# Patient Record
Sex: MALE | Race: OTHER | HISPANIC OR LATINO | URBAN - METROPOLITAN AREA
[De-identification: names, ages, dates, MRNs, and addresses within clinical notes are randomized per-mention and may not be internally consistent; named-entity substitution may affect disease eponyms.]

---

## 2022-12-08 ENCOUNTER — EMERGENCY (EMERGENCY)
Facility: HOSPITAL | Age: 67
LOS: 1 days | Discharge: HOME | End: 2022-12-08
Attending: EMERGENCY MEDICINE
Payer: MEDICARE

## 2022-12-08 VITALS
WEIGHT: 149.91 LBS | DIASTOLIC BLOOD PRESSURE: 123 MMHG | HEIGHT: 66 IN | TEMPERATURE: 98 F | OXYGEN SATURATION: 98 % | SYSTOLIC BLOOD PRESSURE: 201 MMHG | HEART RATE: 70 BPM | RESPIRATION RATE: 16 BRPM

## 2022-12-08 LAB
ALBUMIN SERPL ELPH-MCNC: 4.3 G/DL — SIGNIFICANT CHANGE UP (ref 3.3–5)
ALP SERPL-CCNC: 61 U/L — SIGNIFICANT CHANGE UP (ref 40–120)
ALT FLD-CCNC: 18 U/L — SIGNIFICANT CHANGE UP (ref 10–45)
ANION GAP SERPL CALC-SCNC: 11 MMOL/L — SIGNIFICANT CHANGE UP (ref 5–17)
AST SERPL-CCNC: 16 U/L — SIGNIFICANT CHANGE UP (ref 10–40)
BASOPHILS # BLD AUTO: 0.05 K/UL — SIGNIFICANT CHANGE UP (ref 0–0.2)
BASOPHILS NFR BLD AUTO: 0.7 % — SIGNIFICANT CHANGE UP (ref 0–2)
BILIRUB SERPL-MCNC: 1.2 MG/DL — SIGNIFICANT CHANGE UP (ref 0.2–1.2)
BUN SERPL-MCNC: 13 MG/DL — SIGNIFICANT CHANGE UP (ref 7–23)
CALCIUM SERPL-MCNC: 9.9 MG/DL — SIGNIFICANT CHANGE UP (ref 8.4–10.5)
CHLORIDE SERPL-SCNC: 102 MMOL/L — SIGNIFICANT CHANGE UP (ref 96–108)
CO2 SERPL-SCNC: 25 MMOL/L — SIGNIFICANT CHANGE UP (ref 22–31)
CREAT SERPL-MCNC: 0.77 MG/DL — SIGNIFICANT CHANGE UP (ref 0.5–1.3)
EGFR: 99 ML/MIN/1.73M2 — SIGNIFICANT CHANGE UP
EOSINOPHIL # BLD AUTO: 0.44 K/UL — SIGNIFICANT CHANGE UP (ref 0–0.5)
EOSINOPHIL NFR BLD AUTO: 5.9 % — SIGNIFICANT CHANGE UP (ref 0–6)
FLUAV AG NPH QL: SIGNIFICANT CHANGE UP
FLUBV AG NPH QL: SIGNIFICANT CHANGE UP
GLUCOSE SERPL-MCNC: 91 MG/DL — SIGNIFICANT CHANGE UP (ref 70–99)
HCT VFR BLD CALC: 40.4 % — SIGNIFICANT CHANGE UP (ref 39–50)
HGB BLD-MCNC: 12.8 G/DL — LOW (ref 13–17)
IMM GRANULOCYTES NFR BLD AUTO: 0.3 % — SIGNIFICANT CHANGE UP (ref 0–0.9)
LYMPHOCYTES # BLD AUTO: 2.16 K/UL — SIGNIFICANT CHANGE UP (ref 1–3.3)
LYMPHOCYTES # BLD AUTO: 29 % — SIGNIFICANT CHANGE UP (ref 13–44)
MCHC RBC-ENTMCNC: 27.7 PG — SIGNIFICANT CHANGE UP (ref 27–34)
MCHC RBC-ENTMCNC: 31.7 GM/DL — LOW (ref 32–36)
MCV RBC AUTO: 87.4 FL — SIGNIFICANT CHANGE UP (ref 80–100)
MONOCYTES # BLD AUTO: 0.51 K/UL — SIGNIFICANT CHANGE UP (ref 0–0.9)
MONOCYTES NFR BLD AUTO: 6.8 % — SIGNIFICANT CHANGE UP (ref 2–14)
NEUTROPHILS # BLD AUTO: 4.28 K/UL — SIGNIFICANT CHANGE UP (ref 1.8–7.4)
NEUTROPHILS NFR BLD AUTO: 57.3 % — SIGNIFICANT CHANGE UP (ref 43–77)
NRBC # BLD: 0 /100 WBCS — SIGNIFICANT CHANGE UP (ref 0–0)
PLATELET # BLD AUTO: 168 K/UL — SIGNIFICANT CHANGE UP (ref 150–400)
POTASSIUM SERPL-MCNC: 3.8 MMOL/L — SIGNIFICANT CHANGE UP (ref 3.5–5.3)
POTASSIUM SERPL-SCNC: 3.8 MMOL/L — SIGNIFICANT CHANGE UP (ref 3.5–5.3)
PROT SERPL-MCNC: 7.3 G/DL — SIGNIFICANT CHANGE UP (ref 6–8.3)
RBC # BLD: 4.62 M/UL — SIGNIFICANT CHANGE UP (ref 4.2–5.8)
RBC # FLD: 12.4 % — SIGNIFICANT CHANGE UP (ref 10.3–14.5)
RSV RNA NPH QL NAA+NON-PROBE: SIGNIFICANT CHANGE UP
SARS-COV-2 RNA SPEC QL NAA+PROBE: SIGNIFICANT CHANGE UP
SODIUM SERPL-SCNC: 138 MMOL/L — SIGNIFICANT CHANGE UP (ref 135–145)
WBC # BLD: 7.46 K/UL — SIGNIFICANT CHANGE UP (ref 3.8–10.5)
WBC # FLD AUTO: 7.46 K/UL — SIGNIFICANT CHANGE UP (ref 3.8–10.5)

## 2022-12-08 PROCEDURE — 99053 MED SERV 10PM-8AM 24 HR FAC: CPT

## 2022-12-08 PROCEDURE — 99220: CPT | Mod: 25

## 2022-12-08 PROCEDURE — 76512 OPH US DX B-SCAN: CPT | Mod: 26,LT

## 2022-12-08 PROCEDURE — 70486 CT MAXILLOFACIAL W/O DYE: CPT | Mod: 26,MA

## 2022-12-08 PROCEDURE — 76377 3D RENDER W/INTRP POSTPROCES: CPT | Mod: 26

## 2022-12-08 PROCEDURE — 12011 RPR F/E/E/N/L/M 2.5 CM/<: CPT

## 2022-12-08 PROCEDURE — 70450 CT HEAD/BRAIN W/O DYE: CPT | Mod: 26,MA

## 2022-12-08 RX ORDER — ACETAZOLAMIDE 250 MG/1
500 TABLET ORAL ONCE
Refills: 0 | Status: DISCONTINUED | OUTPATIENT
Start: 2022-12-08 | End: 2022-12-08

## 2022-12-08 RX ORDER — TETANUS TOXOID, REDUCED DIPHTHERIA TOXOID AND ACELLULAR PERTUSSIS VACCINE, ADSORBED 5; 2.5; 8; 8; 2.5 [IU]/.5ML; [IU]/.5ML; UG/.5ML; UG/.5ML; UG/.5ML
0.5 SUSPENSION INTRAMUSCULAR ONCE
Refills: 0 | Status: COMPLETED | OUTPATIENT
Start: 2022-12-08 | End: 2022-12-08

## 2022-12-08 RX ORDER — LATANOPROST 0.05 MG/ML
1 SOLUTION/ DROPS OPHTHALMIC; TOPICAL AT BEDTIME
Refills: 0 | Status: DISCONTINUED | OUTPATIENT
Start: 2022-12-08 | End: 2022-12-11

## 2022-12-08 RX ORDER — ATORVASTATIN CALCIUM 80 MG/1
80 TABLET, FILM COATED ORAL AT BEDTIME
Refills: 0 | Status: DISCONTINUED | OUTPATIENT
Start: 2022-12-08 | End: 2022-12-11

## 2022-12-08 RX ORDER — AMPICILLIN SODIUM AND SULBACTAM SODIUM 250; 125 MG/ML; MG/ML
3 INJECTION, POWDER, FOR SUSPENSION INTRAMUSCULAR; INTRAVENOUS ONCE
Refills: 0 | Status: COMPLETED | OUTPATIENT
Start: 2022-12-08 | End: 2022-12-08

## 2022-12-08 RX ORDER — BRIMONIDINE TARTRATE 2 MG/MG
1 SOLUTION/ DROPS OPHTHALMIC THREE TIMES A DAY
Refills: 0 | Status: DISCONTINUED | OUTPATIENT
Start: 2022-12-08 | End: 2022-12-11

## 2022-12-08 RX ORDER — DORZOLAMIDE HYDROCHLORIDE TIMOLOL MALEATE 20; 5 MG/ML; MG/ML
1 SOLUTION/ DROPS OPHTHALMIC
Refills: 0 | Status: DISCONTINUED | OUTPATIENT
Start: 2022-12-08 | End: 2022-12-11

## 2022-12-08 RX ORDER — AMPICILLIN SODIUM AND SULBACTAM SODIUM 250; 125 MG/ML; MG/ML
3 INJECTION, POWDER, FOR SUSPENSION INTRAMUSCULAR; INTRAVENOUS EVERY 6 HOURS
Refills: 0 | Status: DISCONTINUED | OUTPATIENT
Start: 2022-12-08 | End: 2022-12-08

## 2022-12-08 RX ORDER — SODIUM CHLORIDE 9 MG/ML
3 INJECTION INTRAMUSCULAR; INTRAVENOUS; SUBCUTANEOUS EVERY 8 HOURS
Refills: 0 | Status: DISCONTINUED | OUTPATIENT
Start: 2022-12-08 | End: 2022-12-11

## 2022-12-08 RX ORDER — ACETAMINOPHEN 500 MG
1000 TABLET ORAL ONCE
Refills: 0 | Status: COMPLETED | OUTPATIENT
Start: 2022-12-08 | End: 2022-12-08

## 2022-12-08 RX ORDER — ACETAZOLAMIDE 250 MG/1
500 TABLET ORAL ONCE
Refills: 0 | Status: COMPLETED | OUTPATIENT
Start: 2022-12-08 | End: 2022-12-08

## 2022-12-08 RX ADMIN — TETANUS TOXOID, REDUCED DIPHTHERIA TOXOID AND ACELLULAR PERTUSSIS VACCINE, ADSORBED 0.5 MILLILITER(S): 5; 2.5; 8; 8; 2.5 SUSPENSION INTRAMUSCULAR at 08:31

## 2022-12-08 RX ADMIN — ATORVASTATIN CALCIUM 80 MILLIGRAM(S): 80 TABLET, FILM COATED ORAL at 21:03

## 2022-12-08 RX ADMIN — BRIMONIDINE TARTRATE 1 DROP(S): 2 SOLUTION/ DROPS OPHTHALMIC at 12:30

## 2022-12-08 RX ADMIN — LATANOPROST 1 DROP(S): 0.05 SOLUTION/ DROPS OPHTHALMIC; TOPICAL at 12:31

## 2022-12-08 RX ADMIN — Medication 1 TABLET(S): at 21:03

## 2022-12-08 RX ADMIN — DORZOLAMIDE HYDROCHLORIDE TIMOLOL MALEATE 1 DROP(S): 20; 5 SOLUTION/ DROPS OPHTHALMIC at 13:04

## 2022-12-08 RX ADMIN — DORZOLAMIDE HYDROCHLORIDE TIMOLOL MALEATE 1 DROP(S): 20; 5 SOLUTION/ DROPS OPHTHALMIC at 18:33

## 2022-12-08 RX ADMIN — BRIMONIDINE TARTRATE 1 DROP(S): 2 SOLUTION/ DROPS OPHTHALMIC at 21:04

## 2022-12-08 RX ADMIN — SODIUM CHLORIDE 3 MILLILITER(S): 9 INJECTION INTRAMUSCULAR; INTRAVENOUS; SUBCUTANEOUS at 14:32

## 2022-12-08 RX ADMIN — AMPICILLIN SODIUM AND SULBACTAM SODIUM 200 GRAM(S): 250; 125 INJECTION, POWDER, FOR SUSPENSION INTRAMUSCULAR; INTRAVENOUS at 10:50

## 2022-12-08 RX ADMIN — ACETAZOLAMIDE 500 MILLIGRAM(S): 250 TABLET ORAL at 14:56

## 2022-12-08 RX ADMIN — SODIUM CHLORIDE 3 MILLILITER(S): 9 INJECTION INTRAMUSCULAR; INTRAVENOUS; SUBCUTANEOUS at 21:07

## 2022-12-08 RX ADMIN — Medication 400 MILLIGRAM(S): at 08:30

## 2022-12-08 NOTE — CONSULT NOTE ADULT - SUBJECTIVE AND OBJECTIVE BOX
66 M presents to [facility] ED w/ CC of facial trauma     Patient states he was walking in his sister's house in the dark after using the bathroom. Tripped over object and fell, striking head on corner of door. Patient states LOC for about 5 seconds.     Earlier, patient evaluated by opthalmology, who stated concern regarding presence of retrobulbar hematoma and increased IOP.      PMH: HTN  MEDS: ASA 81mg , unknown BP medication  PSH: denies   ALL: NKDA    ICU Vital Signs Last 24 Hrs  T(C): 36.7 (08 Dec 2022 14:00), Max: 36.7 (08 Dec 2022 12:34)  T(F): 98.1 (08 Dec 2022 14:00), Max: 98.1 (08 Dec 2022 14:00)  HR: 57 (08 Dec 2022 14:00) (57 - 71)  BP: 184/96 (08 Dec 2022 14:00) (177/98 - 201/123)  RR: 16 (08 Dec 2022 14:00) (16 - 18)  SpO2: 99% (08 Dec 2022 14:00) (98% - 100%)    O2 Parameters below as of 08 Dec 2022 14:00  Patient On (Oxygen Delivery Method): room air      EXAM  GEN: NAD  HEAD: normocephalic, no scalp abrasions or hematomas present  EYES: pupils are dilated from opthalmologic exam, slight L limitation on upward gaze, L eye pain on upward and lateral gaze, binocular diplopia on upward gaze. L eye proptotic, chemotic. L periorbital edema and ecchymosis present.   EARS: no otorrhea, no valenzuela sign  NOSE: no rhinorrhea, no septal hematoma, nares clear bilaterally   THROAT/NECK: full cervical ROM, no swelling, no lymphadenopathy, no pain to palpation   MAXILLOFACIAL: repaired laceration present 3mm posterior to L eye, zygomas palpated equally bilaterally  INTRAORAL: no segmental mobility, no subjective change in occlusion, occlusion stable and reproducible  NEURO: mild L CNV1 paresthesia,  CNII-IV intact bilaterally, CNV2,3-XII intact bilaterally      IMPRESSION:  CT HEAD: There is no acute intracranial hemorrhage or depressed calvarial fracture.    CT maxillofacial: Left orbital blow out fracture involving the orbital floor and medial orbital wall. There is partial prolapse of the inferior rectus muscle through the orbital floor defect. Large left retrobulbar hematoma and trace intraorbital emphysema with associated proptosis. Hemorrhagic fluid level is present in the left maxillary sinus.   66 M presents to [facility] ED w/ CC of facial trauma     Patient states he was walking in his sister's house in the dark after using the bathroom. Tripped over object and fell, striking head on corner of door. Patient states LOC for about 5 seconds.     Earlier, patient evaluated by opthalmology, who stated concern regarding presence of retrobulbar hematoma and increased IOP.      PMH: HTN  MEDS: ASA 81mg , unknown BP medication  PSH: denies   ALL: NKDA    ICU Vital Signs Last 24 Hrs  T(C): 36.7 (08 Dec 2022 14:00), Max: 36.7 (08 Dec 2022 12:34)  T(F): 98.1 (08 Dec 2022 14:00), Max: 98.1 (08 Dec 2022 14:00)  HR: 57 (08 Dec 2022 14:00) (57 - 71)  BP: 184/96 (08 Dec 2022 14:00) (177/98 - 201/123)  RR: 16 (08 Dec 2022 14:00) (16 - 18)  SpO2: 99% (08 Dec 2022 14:00) (98% - 100%)    O2 Parameters below as of 08 Dec 2022 14:00  Patient On (Oxygen Delivery Method): room air      EXAM  GEN: NAD  HEAD: normocephalic, no scalp abrasions or hematomas present  EYES: pupils are dilated from opthalmologic exam, slight L limitation on upward gaze, L eye pain on upward and lateral gaze, binocular diplopia on upward gaze. L eye proptotic, chemotic. L periorbital edema and ecchymosis present. Patient tal from 55-47 on downward gaze. Denies nausea, lightheadedness. At baseline, 55-60 BPM.   EARS: no otorrhea, no valenzuela sign  NOSE: no rhinorrhea, no septal hematoma, nares clear bilaterally   THROAT/NECK: full cervical ROM, no swelling, no lymphadenopathy, no pain to palpation   MAXILLOFACIAL: repaired laceration present 3mm posterior to L eye, zygomas palpated equally bilaterally  INTRAORAL: no segmental mobility, no subjective change in occlusion, occlusion stable and reproducible  NEURO: mild L CNV1 paresthesia,  CNII-IV intact bilaterally, CNV2,3-XII intact bilaterally      IMPRESSION:  CT HEAD: There is no acute intracranial hemorrhage or depressed calvarial fracture.    CT maxillofacial: Left orbital blow out fracture involving the orbital floor and medial orbital wall. There is partial prolapse of the inferior rectus muscle through the orbital floor defect. Large left retrobulbar hematoma and trace intraorbital emphysema with associated proptosis. Hemorrhagic fluid level is present in the left maxillary sinus.

## 2022-12-08 NOTE — ED PROVIDER NOTE - PROGRESS NOTE DETAILS
iop l eye 31 optho [facility] and [facility] - ct ordered to eval for retrobulb hemaatoma-  as per optho no indication for timoptic ct with blowout fx and entraPMENT of inf rectus - and large retrobulbar hematoma - repeat pressure and visual acuity unchanged optho at bedside-  no emergent lateral canth but prepared for procedure if vison belen - sudeep decompression of hemartoma because of fx

## 2022-12-08 NOTE — PROGRESS NOTE ADULT - SUBJECTIVE AND OBJECTIVE BOX
[facility] DEPARTMENT OF OPHTHALMOLOGY  ------------------------------------------------------------------------------  Brian Martin MD PGY 3  [phone]  ------------------------------------------------------------------------------    Interval History: Patient reports feeling better around the left eye    MEDICATIONS  (STANDING):  brimonidine 0.2% Ophthalmic Solution 1 Drop(s) Left EYE three times a day  dorzolamide 2%/timolol 0.5% Ophthalmic Solution 1 Drop(s) Left EYE two times a day  latanoprost 0.005% Ophthalmic Solution 1 Drop(s) Left EYE at bedtime  sodium chloride 0.9% lock flush 3 milliLiter(s) IV Push every 8 hours    MEDICATIONS  (PRN):      VITALS: T(C): 36.7 (12-08-22 @ 14:00)  T(F): 98.1 (12-08-22 @ 14:00), Max: 98.1 (12-08-22 @ 14:00)  HR: 57 (12-08-22 @ 14:00) (57 - 71)  BP: 184/96 (12-08-22 @ 14:00) (177/98 - 201/123)  RR:  (16 - 18)  SpO2:  (98% - 100%)  Wt(kg): --  General: AAO x 3, appropriate mood and affect      Ophthalmology Exam:  Visual acuity (cc): 20/30 OD, 20/50 OS  Pupils: pharmacologically dilated  Intraocular Pressure:  17 OS  Extraocular movements (EOMs): Full OD, -1 in abduction and -3 on infraduction OS     Pen Light Exam (PLE)  External: Normal OD. significant ecchymosis and edema of the periorbital area, superior > inferior, ecchymosis on nasal aspect, superficial laceration temporally on face not involving lids or canthus  Lids/Lashes/Lacrimal Ducts: Flat OD, no lid margin involving lacerations, edematous with ecchymosis  Sclera/Conjunctiva: White and quiet OD. significant almost 360 degree sub conj heme with 3+ chemosis inferior and superotemporally, no scleral defects  Cornea: Clear OD. no epi defects, no abrasion, no penetrating injury OS  Anterior Chamber: Deep and formed OU.    Iris: Flat OU.  Lens: NS OU; OD > OS

## 2022-12-08 NOTE — ED CDU PROVIDER INITIAL DAY NOTE - PROGRESS NOTE DETAILS
IOP 15 OS.  Visual acuity still unable to assess 2/2 pupil dilation. -Deniz Bartlett PA-C IOP 13 OS. Spoke with OMFS, will call back with final recommendations.- Jayda Aguirre PA-C

## 2022-12-08 NOTE — CONSULT NOTE ADULT - SUBJECTIVE AND OBJECTIVE BOX
[facility] DEPARTMENT OF OPHTHALMOLOGY - INITIAL ADULT CONSULT  -----------------------------------------------------------------------------------------------------------------  Stevenson Luis MD, PGY2  Available on [employer] Teams  -----------------------------------------------------------------------------------------------------------------    HPI:    Patient is a 66 year old male no PMhx consulted for elevated IOP and chemosis following trauma. Patient was walking into the bathroom when he ran into the door, subsequently falling. Patient states that his eye got significantly swollen and he couldn't open the eye. Having significant pain, and came to the ED for evaluation. States currently that he has eye pain, but that it is improving with pain medication. Also is able to open his eye slightly more than before, but continues to have significant swelling. Denies any changes to his vision, no diplopia, no transient vision loss, no flashes, no floaters.       Past Medical History: none  Past Ocular History: none  Drops: none  Medications: none  Allergies: NKDA  Family History: denies  Surgical History: no surgical history reported  Outpatient Ophthalmologist: none      Review of Systems:  Constitutional: No fever, chills  Eyes: No blurry vision, flashes, floaters, FBS, erythema, discharge, double vision, OU  Neuro: No tremors  Cardiovascular: No chest pain, palpitations  Respiratory: No SOB, no cough  GI: No nausea, vomiting, abdominal pain    Vital Signs: T(C): 36.4 (12-08-22 @ 06:45)  T(F): 97.5 (12-08-22 @ 06:45), Max: 97.5 (12-08-22 @ 06:20)  HR: 70 (12-08-22 @ 06:45) (70 - 70)  BP: 186/117 (12-08-22 @ 06:45) (186/117 - 201/123)  RR:  (16 - 18)  SpO2:  (98% - 99%)  Wt(kg): --  AAOx3    Ophthalmology Exam:  Visual acuity (cc): 20/30 OU  Pupils: pupils reactive, left pupil irregular shape vertical ovoid measures; OD 3mm in light, 4mm in dark; OS 5mm in light, 6mm in dark; no teardrop shape to pupil  Intraocular Pressure:  iCare 15 OD, 40 --> 38 --> 36 OS  Extraocular movements (EOMs): Full OD, horizontal and supraduction essentially full, infraduction -3 to -4 limited likely 2/2 chemosis OS  Confrontational Visual Field (CVF): Full OU.  Color Plates: 12/12 OU.    Pen Light Exam (PLE)  External: Normal OD. significant ecchymosis and edema of the periorbital area, superior > inferior, ecchymosis on nasal aspect, superficial laceration temporally on face not involving lids or canthus  Lids/Lashes/Lacrimal Ducts: Flat OD, no lid margin involving lacerations, edematous with ecchymosis  Sclera/Conjunctiva: White and quiet OD. significant almost 360 degree sub conj heme with 3+ chemosis inferior and superotemporally, no scleral defects, conj manipulated no evidence of penetrating injury OS  Cornea: Clear OD. no epi defects, no abrasion, no penetrating injury OS  Anterior Chamber: Deep and formed OU.    Iris: Flat OU.  Lens: NS OU; OD > OS    Fundus Exam: dilated with 1% tropicamide and 2.5% phenylephrine  Approval obtained from primary team for dilation  Patient aware that pupils can remained dilated for at least 4-6 hours.  Exam performed with 20 D lens    Vitreous: wnl OU  Disc, cup/disc: sharp and pink, 0.3 OU  Macula: wnl OU  Vessels: wnl OU  Periphery: wnl OU    Labs/Imaging:  < from: CT 3D Reconstruct w/ Workstation (12.08.22 @ 08:26) >  CT HEAD: There is no acute intracranial hemorrhage or depressed calvarial   fracture.    CT maxillofacial: Left orbital blow out fracture involving the orbital   floor and medial orbital wall. There is partial prolapse of the inferior   rectus muscle through the orbital floor defect. Large left retrobulbar   hematoma and trace intraorbital emphysema with associated proptosis.   Hemorrhagic  fluid level is present in the left maxillary sinus.    < end of copied text >

## 2022-12-08 NOTE — CONSULT NOTE ADULT - ASSESSMENT
67 y/o M w/ hx of HTN s/p fall sustaining Left orbital blow out fracture involving the orbital floor and medial orbital wall, partial inferior rectus muscle prolapse through orbital floor, and large L. retrobulbar hematoma. Now s/p L facial laceration repair by ED. Evaluated by opthalmology who states that Due to significant fracture, blood from retrobulbar heme is draining into maxillary sinus,  no lateral canthotomy indicated at this time. Initially elevated IOP now normalized.     Plan  Pending discussion with attending     Debra   [facility]  [id number] 67 y/o M w/ hx of HTN s/p fall sustaining Left orbital blow out fracture involving the orbital floor and medial orbital wall, partial inferior rectus muscle prolapse through orbital floor, and large L. retrobulbar hematoma. Now s/p L facial laceration repair by ED. Evaluated by opthalmology who states that Due to significant fracture, blood from retrobulbar heme is draining into maxillary sinus,  no lateral canthotomy indicated at this time. Initially elevated IOP now normalized.     Plan  Pending discussion with attending     Debra   [facility] [place]  [id number] 67 y/o M w/ hx of HTN s/p fall sustaining Left orbital blow out fracture involving the orbital floor and medial orbital wall, partial inferior rectus muscle prolapse through orbital floor, and large L. retrobulbar hematoma. Now s/p L facial laceration repair by ED. Evaluated by opthalmology who states that Due to significant fracture, blood from retrobulbar heme is draining into maxillary sinus,  no lateral canthotomy indicated at this time. Initially elevated IOP now normalized.     Plan  Eye care as per optho recs  admit in CDU for overnight observation. OMFS will reevaluate patient status in AM    Debra   OMFS  [id number]

## 2022-12-08 NOTE — ED CDU PROVIDER INITIAL DAY NOTE - WET READ LAUNCH FT
· QTc 524 on EKG in the ED  · Repeat EKG 4/19 a m  revealed improved QTC of 500     · Likely 2/2 electrolyte disturbances- electrolytes replenished as above  · Avoid QT prolonging agents There are no Wet Read(s) to document.

## 2022-12-08 NOTE — ED CDU PROVIDER INITIAL DAY NOTE - MEDICAL DECISION MAKING DETAILS
janine 66m with mech fall with left blowout fx and radiographic entrapment none clinically with chosis and swelling inc iop, ct with retrobulb hematoma draining into sinus - seen by ioptho visual acuioty 20/23 stable pressures maxed at 40 and declining - freq iop, freq visual acuity, final omfs recs- possible timoptic and diamox as per optho will see again today as well as tomorrow- cont abx for fx-

## 2022-12-08 NOTE — ED CDU PROVIDER INITIAL DAY NOTE - PHYSICAL EXAMINATION
Gen: AAO x 3, NAD  Skin: No rashes or lesions  HEENT: NC/AT, EOMI, pupils dilated (2/2 ophtho exam) unable to assess visual acuity.  large subconjunctival hematoma    Resp: unlabored CTAB  Cardiac: rrr s1s2, no murmurs, rubs or gallops  GI: ND, +BS, Soft, NT  Ext: no pedal edema, FROM in all extremities  Neuro: no focal deficits. Strength 5/.5 BUE and BLE, sensation intact, normal gait

## 2022-12-08 NOTE — ED PROVIDER NOTE - CLINICAL SUMMARY MEDICAL DECISION MAKING FREE TEXT BOX
janine 66 m htn presents sp hit face against door in friends home, ? loc unk last td - mild dizziness after fall no n/v no cp or lightheadedness before or after fall- on eval l temp laceration .5 cm - chemosis left eye pos subconjunctival hemmorage- , pt on asa no ac, visual acuity 20/25, no diplolplia no ipsi anesthiesoa eomi - no clnincal signs of entrapment - ct r/o retrobulb hematoma- , ct r/o ich w loc  no focal defixcits no lateralizing signs on neuro exam gcs 15 iop to eval eye - will need laceration repair -

## 2022-12-08 NOTE — ED PROVIDER NOTE - NS ED ROS FT
ROS:  -Constitutional: Denies fever  -Head: + headache  -Eyes: Denies blurry vision  -Cardiovascular: Denies chest pain  -Pulmonary: Denies shortness of breath  -Gastrointestinal: Denies nausea or diarrhea  -Genitourinary: Denies dysuria  -Skin: Denies new rashes  -Neuro: Denies numbness or tingling

## 2022-12-08 NOTE — PROGRESS NOTE ADULT - ASSESSMENT
Assessment and Recommendations:  66y male with no past medical history/ocular history consulted for ocular trauma and elevated IOP, found to have significant retrobulbar hemorrhage, large left orbital blow out fracture with prolapse of inferior rectus, and chemosis.     # Retrobulbar hemorrhage, OS   # Orbital blowout fracture, OS  # Chemosis, OS  # Elevated IOP, OS  - Significant ecchymosis and swelling of the periorbital area and superficial temporal laceration on left side of face  - Due to significant fracture, blood from retrobulbar heme is draining into maxillary sinus,  no lateral canthotomy indicated at this time  - EOM limitation more likely 2/2 chemosis in infraduction; supraduction appears grossly intact  - Vision intact, no sign of optic nerve dysfunction  - Dilated exam normal, no concern for globe rupture  - IOP trending downwards 40 -->17 prior to discharge  - s/p IV diamox 500mg  - Continue Cosopt BID, brimonidine TID, and latanoprost QHS to the left eye  - Ice pack Q1-2 for first 48 hours of injury  - Can use artificial tears for comfort     DW Dr. Gomez, oculoplastics    Outpatient Follow-up: Patient should follow-up with his/her ophthalmologist or with [facility] Department of Ophthalmology within 1 week of after discharge at:    [address]  [place] [address]  [phone]   Assessment and Recommendations:  66y male with no past medical history/ocular history consulted for ocular trauma and elevated IOP, found to have significant retrobulbar hemorrhage, large left orbital blow out fracture with prolapse of inferior rectus, and chemosis.     # Retrobulbar hemorrhage, OS   # Orbital blowout fracture, OS  # Chemosis, OS  # Elevated IOP, OS  - Significant ecchymosis and swelling of the periorbital area and superficial temporal laceration on left side of face  - Due to significant fracture, blood from retrobulbar heme is draining into maxillary sinus,  no lateral canthotomy indicated at this time  - EOM limitation more likely 2/2 chemosis in infraduction; supraduction appears grossly intact  - Vision intact, no sign of optic nerve dysfunction  - Dilated exam normal, no concern for globe rupture  - IOP trending downwards 40 -->17 prior to discharge  - s/p IV diamox 500mg  - Continue Cosopt BID, brimonidine TID, and latanoprost QHS to the left eye outpatient  - Please discharge with medrol dose pack for swelling, maxitrol ointment QID to left eye for swelling, and Augmentin 875/125mg BID for 7 days  - Ice pack Q1-2 for first 48 hours of injury  - Can use artificial tears for comfort     DW Dr. Gomez, oculoplastics    Outpatient Follow-up: Patient should follow-up with his/her ophthalmologist or with [facility] Department of Ophthalmology within 1 week of after discharge at:    [address]  [place] [address]  [phone]

## 2022-12-08 NOTE — ED CDU PROVIDER INITIAL DAY NOTE - OBJECTIVE STATEMENT
66y male history of htn, complains of L eye pain after he was walking to the bathroom, did not see the door, hit his head on the door and felt dizziness. Mild HA,. No diplopia, no vision changes, No F/C/N/V.    In the ED VSS.  Labs unremarkable.  CT head/orbits showing left orbital blow out fracture with large retrobulbar hematoma.  IOP 36 with normal visual acuity.  Ophthalmology consulted and recommending to observe in the CDU for serial pressure checks.  Started on diamox and drops.

## 2022-12-08 NOTE — ED PROVIDER NOTE - OBJECTIVE STATEMENT
66y male history of htn, complains of L eye pain after he was walking to the bathroom, did not see the door, hit his head on the door and felt dizziness. Mild HA,. No diplopia, no vision changes, No F/C/N/V.

## 2022-12-08 NOTE — ED CDU PROVIDER INITIAL DAY NOTE - DETAILS
vital signs q4h, check IOP q4h, visual acuity q4h, Ophthalmology consultation, frequent re-evaluations  case d/w Dr. Grewal

## 2022-12-08 NOTE — ED PROVIDER NOTE - CARE PLAN
1 Principal Discharge DX:	Facial contusion   Principal Discharge DX:	Facial contusion  Secondary Diagnosis:	Retrobulbar hematoma

## 2022-12-08 NOTE — ED PROVIDER NOTE - PHYSICAL EXAMINATION
PHYSICAL EXAM:  CONSTITUTIONAL: Well appearing, awake, alert, oriented to person, place, time/situation and in no apparent distress.  HEAD: Atraumatic  EYES: pupils equal, round and reactive to light, hemolysis L eye. L eye vision 20/30. peripheral vision intact. significant periorbital edema, ecchymosis. No pain with EOM. EOMI.   ENMT: Airway patent, Nasal mucosa clear. Mouth with normal mucosa. Uvula is midline.   CARDIAC: Normal rate, regular rhythm. +S1/S2. No murmurs, rubs or gallops.  RESPIRATORY: Breathing unlabored. Breath sounds clear and equal bilaterally.  ABDOMEN:  Soft, nontender, nondistended. No rebound tenderness or guarding.  NEUROLOGICAL: Alert and oriented, no focal deficits, no motor or sensory deficits. CN2-12 intact. Sensation intact x4 extremities.  SKIN: Skin warm and dry. No evidence of rashes or lesions.

## 2022-12-08 NOTE — ED ADULT NURSE NOTE - OBJECTIVE STATEMENT
66y Male AOx4 with PMH of HTN presents to the ED s/p fall. Pt states he was walking to the bathroom with the lights off, hit the left side of his face with the door, felt dizzy then collapsed. Endorses LOC for a few seconds. Woke up face down, was able to ambulate afterwards. Gauze applied to laceration by EMS. No other active bleeding or obvious deformity noted. Takes ASA daily. Per EMS, pt SBP 200s. Denies N/V, fever/chills, SOB, chest pain. Placed on cardiac monitor. Spontaneous/unlabored respirations, speaking in full sentences. Side rails up, bed in lowest position, oriented to call bell, safety maintained.

## 2022-12-08 NOTE — CONSULT NOTE ADULT - ASSESSMENT
Assessment and Recommendations:  66y male with no past medical history/ocular history consulted for ocular trauma and elevated IOP, found to have significant retrobulbar hemorrhage, large left orbital blow out fracture with prolapse of inferior rectus, and chemosis. Patient VA 20/30 OU, IOP 15 OD, elevated to max of 40 OS, EOM mostly intact with limitation of infraduction of OS likely 2/2 significant chemosis, PERRLA with no APD, mildly irregular left pupil documented in exam, color plates and CVF full. Anterior exam with significant subconj heme and chemosis inferiorly and superotemporally of the left eye, with no evidence of penetrating injury or corneal defects. Dilated exam within normal limits.    1) Retrobulbar hemorrhage OS; left orbital blowout fracture; chemosis; elevated IOP OS  - patient suffered this injury after running into a door and falling  - significant ecchymosis and swelling of the periorbital area and superficial temporal laceration on left side of face  - due to significant fracture, blood from retrobulbar heme is draining into maxillary sinus  - EOM limitation more likely 2/2 chemosis in infraduction; supraduction appears grossly intact  - PERRLA with no APD, color plates are full  - dilated exam normal, no concern for globe rupture  - no lateral canthotomy indicated at this time  - baseline VA FOLLOWING dilation is 20/40  - IOP trending downwards 40 --> 38 --> 36  - will require serial IOP and VA examination - ophtho will see again in afternoon as well  - start Cosopt 1 gtt OS BID  - start brimonidine 1 gtt OS TID  - start latanoprost 1 gtt OS qHS  - give 1x dose 500 mg Diamox  - can use artificial tears for comfort   - please call ophthalmology if any acute changes to vision, color vision  - drops ordered by ophtho    SDW Dr. Finch, chief. DW Dr. Gomez, oculoplastics, Dr. Roberts trauma attending.    Outpatient Follow-up: Patient should follow-up with his/her ophthalmologist or with [facility] Department of Ophthalmology within 1 week of after discharge at:    [address]  [place] [address]  [phone]    Stevenson Luis MD, PGY2  Also available on [employer] Teams

## 2022-12-08 NOTE — ED CDU PROVIDER INITIAL DAY NOTE - ATTENDING CONTRIBUTION TO CARE
This was a shared visit with KALIA.  I have reviewed and discussed the case with the KALIA and agree with verified documentation unless otherwise documented.  I have independently spoken with and examined the patient and my documentation of history/pe and MDM are above.

## 2022-12-08 NOTE — ED CDU PROVIDER INITIAL DAY NOTE - NS ED ATTENDING STATEMENT MOD
I have seen and examined this patient and fully participated in the care of this patient as the teaching attending.  The service was shared with the KALIA.  I reviewed and verified the documentation and independently performed the documented:

## 2022-12-09 VITALS
RESPIRATION RATE: 18 BRPM | HEART RATE: 60 BPM | OXYGEN SATURATION: 98 % | SYSTOLIC BLOOD PRESSURE: 122 MMHG | DIASTOLIC BLOOD PRESSURE: 71 MMHG | TEMPERATURE: 98 F

## 2022-12-09 PROCEDURE — 99217: CPT

## 2022-12-09 PROCEDURE — 80053 COMPREHEN METABOLIC PANEL: CPT

## 2022-12-09 PROCEDURE — 87637 SARSCOV2&INF A&B&RSV AMP PRB: CPT

## 2022-12-09 PROCEDURE — 70486 CT MAXILLOFACIAL W/O DYE: CPT | Mod: MA

## 2022-12-09 PROCEDURE — 76512 OPH US DX B-SCAN: CPT

## 2022-12-09 PROCEDURE — 36415 COLL VENOUS BLD VENIPUNCTURE: CPT

## 2022-12-09 PROCEDURE — 85025 COMPLETE CBC W/AUTO DIFF WBC: CPT

## 2022-12-09 PROCEDURE — 96375 TX/PRO/DX INJ NEW DRUG ADDON: CPT

## 2022-12-09 PROCEDURE — 90715 TDAP VACCINE 7 YRS/> IM: CPT

## 2022-12-09 PROCEDURE — 99285 EMERGENCY DEPT VISIT HI MDM: CPT | Mod: 25

## 2022-12-09 PROCEDURE — G0378: CPT

## 2022-12-09 PROCEDURE — 76377 3D RENDER W/INTRP POSTPROCES: CPT

## 2022-12-09 PROCEDURE — 96374 THER/PROPH/DIAG INJ IV PUSH: CPT

## 2022-12-09 PROCEDURE — 70450 CT HEAD/BRAIN W/O DYE: CPT | Mod: MA

## 2022-12-09 PROCEDURE — 90471 IMMUNIZATION ADMIN: CPT

## 2022-12-09 RX ORDER — NEOMYCIN/POLYMYXIN B/DEXAMETHA 0.1 %
1 SUSPENSION, DROPS(FINAL DOSAGE FORM)(ML) OPHTHALMIC (EYE)
Qty: 1 | Refills: 0
Start: 2022-12-09 | End: 2022-12-15

## 2022-12-09 RX ORDER — DORZOLAMIDE HYDROCHLORIDE TIMOLOL MALEATE 20; 5 MG/ML; MG/ML
1 SOLUTION/ DROPS OPHTHALMIC
Qty: 1 | Refills: 0
Start: 2022-12-09 | End: 2022-12-15

## 2022-12-09 RX ORDER — BRIMONIDINE TARTRATE 2 MG/MG
1 SOLUTION/ DROPS OPHTHALMIC
Qty: 1 | Refills: 0
Start: 2022-12-09 | End: 2022-12-15

## 2022-12-09 RX ORDER — LATANOPROST 0.05 MG/ML
1 SOLUTION/ DROPS OPHTHALMIC; TOPICAL
Qty: 1 | Refills: 0
Start: 2022-12-09 | End: 2022-12-15

## 2022-12-09 RX ADMIN — SODIUM CHLORIDE 3 MILLILITER(S): 9 INJECTION INTRAMUSCULAR; INTRAVENOUS; SUBCUTANEOUS at 08:23

## 2022-12-09 RX ADMIN — Medication 1 TABLET(S): at 08:28

## 2022-12-09 RX ADMIN — BRIMONIDINE TARTRATE 1 DROP(S): 2 SOLUTION/ DROPS OPHTHALMIC at 05:05

## 2022-12-09 RX ADMIN — DORZOLAMIDE HYDROCHLORIDE TIMOLOL MALEATE 1 DROP(S): 20; 5 SOLUTION/ DROPS OPHTHALMIC at 06:30

## 2022-12-09 NOTE — ED CDU PROVIDER DISPOSITION NOTE - CLINICAL COURSE
66y male history of htn, complains of L eye pain after he was walking to the bathroom, did not see the door, hit his head on the door and felt dizziness. Mild HA,. No diplopia, no vision changes, No F/C/N/V.  In the ED VSS.  Labs unremarkable.  CT head/orbits showing left orbital blow out fracture with large retrobulbar hematoma.  IOP 36 with normal visual acuity.  Ophthalmology consulted and recommending to observe in the CDU for serial pressure checks.  Started on diamox and drops. 66y male history of htn, complains of L eye pain after he was walking to the bathroom, did not see the door, hit his head on the door and felt dizziness. Mild HA,. No diplopia, no vision changes, No F/C/N/V.  In the ED VSS.  Labs unremarkable.  CT head/orbits showing left orbital blow out fracture with large retrobulbar hematoma.  IOP 36 with normal visual acuity.  Ophthalmology consulted and recommending to observe in the CDU for serial pressure checks.  Started on diamox and drops.  On am assessment patient resting comfortably without acute complaint.  NAD.  VSS.  Repeat VA 20/50, unchanged.  IOP OS 10.  Final recs from OMFS and ophthalmology appreciated.  Patient to follow-up with ophthalmology clinic today, follow-up with OMFS in 3 days.  Will DC with Augmentin and sinus precautions.  Seen by Dr. Ascencio in CDU. Discussed plan and return precautions with patient who understands and agrees. All questions answered.

## 2022-12-09 NOTE — ED CDU PROVIDER SUBSEQUENT DAY NOTE - MEDICAL DECISION MAKING DETAILS
Dr. Ascencio: I performed a face to face bedside interview with patient regarding history of present illness, review of symptoms and past medical history. I completed an independent physical exam.  I have discussed patient's plan of care with PA.   I agree with note as stated above, having amended the EMR as needed to reflect my findings.   This includes HISTORY OF PRESENT ILLNESS, HIV, PAST MEDICAL/SURGICAL/FAMILY/SOCIAL HISTORY, ALLERGIES AND HOME MEDICATIONS, REVIEW OF SYSTEMS, PHYSICAL EXAM, and any PROGRESS NOTES during the time I functioned as the attending physician for this patient.    Dr. Ascencio: 66-year-old male history of hypertension, placed in the CDU after injury to left thigh.  Patient found to have a retrobulbar hematoma and orbital blowout fx.  Initially patient with intraocular pressures of 40, seen by OMFS and Ortho, Diamox drops started, intraocular pressure decreased.  Visual acuity stable at 20/50.  Patient doing well today.  Has a ride to get home.  Patient to follow-up with Ortho as outpatient and discharged on drops as noted in ophthalmology note.  Patient also cleared by OMFS to go home and follow-up as outpatient in the clinic with sinus precautions, and Augmentin.

## 2022-12-09 NOTE — ED CDU PROVIDER SUBSEQUENT DAY NOTE - PROGRESS NOTE DETAILS
IOP 9 OS  Visual acuity 20/50 OS  Patient without complaints, states that he is feeling improved. - Jayda Aguirre PA-C CDU PROGRESS NOTE ALE WATSON: Patient resting comfortably without acute complaint.  NAD.  VSS.  Repeat VA 20/50, unchanged.  IOP OS 10.  Final recs from OMFS and ophthalmology appreciated.  Patient to follow-up with ophthalmology clinic today, follow-up with OMFS in 3 days.  Will DC with Augmentin and sinus precautions.  Seen by Dr. Ascencio in CDU. Will dc with follow up. Discussed plan and return precautions with patient who understands and agrees. All questions answered.

## 2022-12-09 NOTE — ED ADULT NURSE REASSESSMENT NOTE - NS ED NURSE REASSESS COMMENT FT1
07.00 Am Received the Pt from  ALEENA Villarreal Pt is observed for  Facial trauma with retrobulbar hematoma .Received the Pt A&OX 4 obeys commands Krystyna N/V/D fever chills cp SOB Comfort care & safety measures continued  IV site looks clean & dry no signs of infiltration noted pt denies  pain IV site .Pt is advised to call for help  call bell with in the reach pt verbalized the understanding .pending CDU  MD gardner . GCS 15/15 A&OX 4 +Left eye swelling with hematoma pt is able to see from Left eye  .Strong upper & lower extremities steady gait  No facial droop  No Hand Leg drop denies numbness tingling +Continue to monitor  09.00  Pt is evaluated by CDU MD Silva jacob  pt is feeling better.  Pt is discharged . Ml manda Juares   explained the follow up care & gave the discharge summary  . Pt has stable vitals steady gait A&OX 4 at the time of Discharge
pharmacy called for eye drops.
Received pt from ALEENA Edwards, pt aox3, comfort and safety maintained, patient has left eye bruise from fall, able to open left eye but swollen and bloody drainage coming out. pt denies blurry vision, pain management provided, Pt oriented to CDU. Awaiting Optho followup. Call bell in place, will continue with plan of care.

## 2022-12-09 NOTE — ED CDU PROVIDER DISPOSITION NOTE - NSFOLLOWUPINSTRUCTIONS_ED_ALL_ED_FT
We are recommending that you start the following medications:   -Cosopt eye drops: 1 drop to the LEFT eye twice a day   -Brimonidine eye drops: 1 drop to the LEFT eye three times a day   -Latanoprost eye drops: 1 drop to the LEFT eye at bedtime  -Maxitrol ointment: Apply to the left eye four times a day   -Augmentin 875/125mg pills: Take twice a day for 7 days  -Medrol dose pack: Please read the instructions and take accordingly    *Opthalmology/OMFS follow up We are recommending that you start the following medications:   -Cosopt eye drops: 1 drop to the LEFT eye twice a day   -Brimonidine eye drops: 1 drop to the LEFT eye three times a day   -Latanoprost eye drops: 1 drop to the LEFT eye at bedtime  -Maxitrol ointment: Apply to the left eye four times a day   -Augmentin 875/125mg pills: Take twice a day for 7 days  -Medrol dose pack: Please read the instructions and take accordingly    *Opthalmology/OMFS follow up  Please follow up in Ophthalmology clinic TODAY - call ahead for appointment.    Follow up with OMFS Dr. العراقي this Wednesday 12/14/22. Call to schedule an appointment.  [facility]  [address] [place] [address]  [phone]    IMPORTANT SINUS PRECAUTIONS:***  No smoking  No nose blowing  No closed nose or close mouth sneezing  No straws  No flying, swimming or scuba diving    Return to the Emergency Department for worsening pain, vision changes, difficulty moving your eyes, fever, chills, vomiting, severe headache, or any other concerns.

## 2022-12-09 NOTE — ED CDU PROVIDER SUBSEQUENT DAY NOTE - HISTORY
No interval changes since initial CDU provider note. Pt feels well without complaint, denies change to his vision. NAD VSS. Well appearing. No events on tele-HR in the 60s sinus rhythm.   IOP 14 OS, Visual acuity with both eyes open 20/30. Will continue to monitor. Pending [facility] final recommendations, opthalmology following. - Jayda Aguirre PA-C No interval changes since initial CDU provider note. Pt feels well without complaint, denies change to his vision. NAD VSS. Well appearing. No events on tele-HR in the 60s sinus rhythm.   IOP 14 OS. Will continue to monitor. Pending OM final recommendations, opthalmology following. - Jayda Aguirre PA-C

## 2022-12-09 NOTE — ED CDU PROVIDER DISPOSITION NOTE - NSFOLLOWUPCLINICS_GEN_ALL_ED_FT
[facility] - Ophthalmology  Ophthalmology  [address]  [place] [address]  Phone: [phone]  Fax:

## 2022-12-09 NOTE — ED CDU PROVIDER SUBSEQUENT DAY NOTE - NS ED ATTENDING STATEMENT MOD
This was a shared visit with the KALIA. I reviewed and verified the documentation and independently performed the documented:

## 2022-12-09 NOTE — PROGRESS NOTE ADULT - ASSESSMENT
65 y/o M w/ hx of HTN s/p fall sustaining Left orbital blow out fracture involving the orbital floor and medial orbital wall, partial inferior rectus muscle prolapse through orbital floor, and large L. retrobulbar hematoma. Now s/p L facial laceration repair by ED. Evaluated by opthalmology who states that Due to significant fracture, blood from retrobulbar heme is draining into maxillary sinus,  no lateral canthotomy indicated at this time. Initially elevated IOP now normalized.     Plan   65 y/o M w/ hx of HTN s/p fall sustaining Left orbital blow out fracture involving the orbital floor and medial orbital wall, partial inferior rectus muscle prolapse through orbital floor, and large L. retrobulbar hematoma. Now s/p L facial laceration repair by ED. Evaluated by opthalmology who states that Due to significant fracture, blood from retrobulbar heme is draining into maxillary sinus,  no lateral canthotomy indicated at this time. Initially elevated IOP now normalized.     Plan  - Augmentin 875 BIDx10 days  - sinus precautions (no nose blowing, sneeze with open mouth, no drinking through straws)  - Patient to make follow up appointment for Wednesday at [facility] clinic with Dr. العراقي. Patient encouraged to stay in area for appointment. If unable to stay, patient encouraged to follow up with outside OMFS in [place] for definitive treatment.     [facility]  [address] [address]  [phone]    [facility]  [id number]

## 2022-12-09 NOTE — ED CDU PROVIDER DISPOSITION NOTE - PATIENT PORTAL LINK FT
You can access the FollowMyHealth Patient Portal offered by  by registering at the following website: [email or web address]. By joining [employer]’s FollowMyHealth portal, you will also be able to view your health information using other applications (apps) compatible with our system.

## 2022-12-09 NOTE — ED CDU PROVIDER SUBSEQUENT DAY NOTE - NS ED ROS FT
Constitutional: No fever or chills  Eyes: No visual changes, +eye redness  CV: No chest pain or lower extremity edema  Resp: No SOB no cough  GI: No abd pain. No nausea or vomiting. No diarrhea.  : No dysuria, hematuria.   MSK: No musculoskeletal pain  Skin: +laceration   Psych: No complaints   Neuro: + headache. No numbness or tingling. No weakness.  Endo: no known diabetes

## 2022-12-09 NOTE — ED CDU PROVIDER SUBSEQUENT DAY NOTE - PHYSICAL EXAMINATION
Gen: AAO x 3, NAD  Psych: Appropriate mood and affect   	HEENT: NC/AT, EOMI, pupils dilated (2/2 ophtho exam) unable to assess visual acuity.  large subconjunctival hematoma    	Resp: unlabored CTAB  	Cardiac: rrr s1s2, no murmurs, rubs or gallops  	GI: ND, +BS, Soft, NT  	Ext: no pedal edema, FROM in all extremities  Neuro: no focal deficits. Strength 5/.5 BUE and BLE, sensation intact, normal gait

## 2022-12-09 NOTE — PROGRESS NOTE ADULT - SUBJECTIVE AND OBJECTIVE BOX
Patient evaluated at CDU bedside, resting comfortably in NAD. No events overnight. Patient states he feels subjectively well, pain is well controlled. Patient states he intends to drive back to his home in [place] on Tuesday, 12/13.       Exam:  GEN: NAD  HEAD: normocephalic, no scalp abrasions or hematomas present  EYES: upward gaze limitation improved from day prior, L eye pain on upward and lateral gaze-improved from day prior, no longer endorses binocular diplopia on upward gaze. Slightly reduced L eye proptosis, chemotic. L periorbital edema and ecchymosis present. HR ranges from 55-70, not affected by any particular occular movements. Denies nausea, lightheadedness.    EARS: no otorrhea, no valenzuela sign  NOSE: no rhinorrhea, no septal hematoma, nares clear bilaterally   THROAT/NECK: full cervical ROM, no swelling, no lymphadenopathy, no pain to palpation   MAXILLOFACIAL: repaired laceration present 3mm posterior to L eye, zygomas palpated equally bilaterally  INTRAORAL: no segmental mobility, no subjective change in occlusion, occlusion stable and reproducible  NEURO: mild L CNV1 paresthesia,  CNII-IV intact bilaterally, CNV2,3-XII intact bilaterally    ICU Vital Signs Last 24 Hrs  T(C): 36.8 (09 Dec 2022 05:00), Max: 36.9 (09 Dec 2022 00:28)  T(F): 98.3 (09 Dec 2022 05:00), Max: 98.5 (09 Dec 2022 00:28)  HR: 66 (09 Dec 2022 05:00) (57 - 71)  BP: 152/87 (09 Dec 2022 05:00) (115/71 - 184/96)  BP(mean): 103 (09 Dec 2022 05:00) (103 - 103)  RR: 18 (09 Dec 2022 05:00) (16 - 18)  SpO2: 98% (09 Dec 2022 05:00) (98% - 100%)    O2 Parameters below as of 09 Dec 2022 05:00  Patient On (Oxygen Delivery Method): room air

## 2025-01-02 NOTE — ED ADULT NURSE NOTE - NURSING NEURO ORIENTATION
[facility] Refill Guideline reviewed.  Medication meets criteria for refill.   
oriented to person, place and time